# Patient Record
Sex: MALE | Race: BLACK OR AFRICAN AMERICAN | NOT HISPANIC OR LATINO | URBAN - METROPOLITAN AREA
[De-identification: names, ages, dates, MRNs, and addresses within clinical notes are randomized per-mention and may not be internally consistent; named-entity substitution may affect disease eponyms.]

---

## 2017-06-03 ENCOUNTER — EMERGENCY (EMERGENCY)
Facility: HOSPITAL | Age: 57
LOS: 1 days | Discharge: HOME | End: 2017-06-03
Attending: EMERGENCY MEDICINE | Admitting: EMERGENCY MEDICINE
Payer: COMMERCIAL

## 2017-06-03 VITALS
HEART RATE: 56 BPM | TEMPERATURE: 98 F | RESPIRATION RATE: 16 BRPM | DIASTOLIC BLOOD PRESSURE: 97 MMHG | SYSTOLIC BLOOD PRESSURE: 159 MMHG | OXYGEN SATURATION: 100 %

## 2017-06-03 DIAGNOSIS — K42.9 UMBILICAL HERNIA WITHOUT OBSTRUCTION OR GANGRENE: ICD-10-CM

## 2017-06-03 LAB
ALBUMIN SERPL ELPH-MCNC: 3.9 G/DL — SIGNIFICANT CHANGE UP (ref 3.3–5)
ALP SERPL-CCNC: 42 U/L — SIGNIFICANT CHANGE UP (ref 40–120)
ALT FLD-CCNC: 21 U/L — SIGNIFICANT CHANGE UP (ref 4–41)
AMYLASE P1 CFR SERPL: 73 U/L — SIGNIFICANT CHANGE UP (ref 25–125)
APPEARANCE UR: CLEAR — SIGNIFICANT CHANGE UP
APTT BLD: 37 SEC — SIGNIFICANT CHANGE UP (ref 27.5–37.4)
AST SERPL-CCNC: 22 U/L — SIGNIFICANT CHANGE UP (ref 4–40)
BASE EXCESS BLDV CALC-SCNC: 6 MMOL/L — SIGNIFICANT CHANGE UP
BASOPHILS # BLD AUTO: 0.01 K/UL — SIGNIFICANT CHANGE UP (ref 0–0.2)
BASOPHILS NFR BLD AUTO: 0.2 % — SIGNIFICANT CHANGE UP (ref 0–2)
BILIRUB SERPL-MCNC: 0.2 MG/DL — SIGNIFICANT CHANGE UP (ref 0.2–1.2)
BILIRUB UR-MCNC: NEGATIVE — SIGNIFICANT CHANGE UP
BLOOD GAS VENOUS - CREATININE: 1.13 MG/DL — SIGNIFICANT CHANGE UP (ref 0.5–1.3)
BLOOD UR QL VISUAL: NEGATIVE — SIGNIFICANT CHANGE UP
BUN SERPL-MCNC: 18 MG/DL — SIGNIFICANT CHANGE UP (ref 7–23)
CALCIUM SERPL-MCNC: 9.1 MG/DL — SIGNIFICANT CHANGE UP (ref 8.4–10.5)
CHLORIDE BLDV-SCNC: 107 MMOL/L — SIGNIFICANT CHANGE UP (ref 96–108)
CHLORIDE SERPL-SCNC: 103 MMOL/L — SIGNIFICANT CHANGE UP (ref 98–107)
CO2 SERPL-SCNC: 24 MMOL/L — SIGNIFICANT CHANGE UP (ref 22–31)
COLOR SPEC: SIGNIFICANT CHANGE UP
CREAT SERPL-MCNC: 1.16 MG/DL — SIGNIFICANT CHANGE UP (ref 0.5–1.3)
EOSINOPHIL # BLD AUTO: 0.1 K/UL — SIGNIFICANT CHANGE UP (ref 0–0.5)
EOSINOPHIL NFR BLD AUTO: 1.8 % — SIGNIFICANT CHANGE UP (ref 0–6)
GAS PNL BLDV: 135 MMOL/L — LOW (ref 136–146)
GLUCOSE BLDV-MCNC: 97 — SIGNIFICANT CHANGE UP (ref 70–99)
GLUCOSE SERPL-MCNC: 95 MG/DL — SIGNIFICANT CHANGE UP (ref 70–99)
GLUCOSE UR-MCNC: NEGATIVE — SIGNIFICANT CHANGE UP
HCO3 BLDV-SCNC: 27 MMOL/L — SIGNIFICANT CHANGE UP (ref 20–27)
HCT VFR BLD CALC: 46 % — SIGNIFICANT CHANGE UP (ref 39–50)
HCT VFR BLDV CALC: 48.3 % — SIGNIFICANT CHANGE UP (ref 39–51)
HGB BLD-MCNC: 15.2 G/DL — SIGNIFICANT CHANGE UP (ref 13–17)
HGB BLDV-MCNC: 15.8 G/DL — SIGNIFICANT CHANGE UP (ref 13–17)
IMM GRANULOCYTES NFR BLD AUTO: 0 % — SIGNIFICANT CHANGE UP (ref 0–1.5)
INR BLD: 0.96 — SIGNIFICANT CHANGE UP (ref 0.88–1.17)
KETONES UR-MCNC: NEGATIVE — SIGNIFICANT CHANGE UP
LACTATE BLDV-MCNC: 1 MMOL/L — SIGNIFICANT CHANGE UP (ref 0.5–2)
LEUKOCYTE ESTERASE UR-ACNC: NEGATIVE — SIGNIFICANT CHANGE UP
LIDOCAIN IGE QN: 28.2 U/L — SIGNIFICANT CHANGE UP (ref 7–60)
LYMPHOCYTES # BLD AUTO: 1.63 K/UL — SIGNIFICANT CHANGE UP (ref 1–3.3)
LYMPHOCYTES # BLD AUTO: 30.1 % — SIGNIFICANT CHANGE UP (ref 13–44)
MCHC RBC-ENTMCNC: 30.9 PG — SIGNIFICANT CHANGE UP (ref 27–34)
MCHC RBC-ENTMCNC: 33 % — SIGNIFICANT CHANGE UP (ref 32–36)
MCV RBC AUTO: 93.5 FL — SIGNIFICANT CHANGE UP (ref 80–100)
MONOCYTES # BLD AUTO: 0.49 K/UL — SIGNIFICANT CHANGE UP (ref 0–0.9)
MONOCYTES NFR BLD AUTO: 9 % — SIGNIFICANT CHANGE UP (ref 2–14)
MUCOUS THREADS # UR AUTO: SIGNIFICANT CHANGE UP
NEUTROPHILS # BLD AUTO: 3.19 K/UL — SIGNIFICANT CHANGE UP (ref 1.8–7.4)
NEUTROPHILS NFR BLD AUTO: 58.9 % — SIGNIFICANT CHANGE UP (ref 43–77)
NITRITE UR-MCNC: NEGATIVE — SIGNIFICANT CHANGE UP
PCO2 BLDV: 59 MMHG — HIGH (ref 41–51)
PH BLDV: 7.35 PH — SIGNIFICANT CHANGE UP (ref 7.32–7.43)
PH UR: 5.5 — SIGNIFICANT CHANGE UP (ref 4.6–8)
PLATELET # BLD AUTO: 270 K/UL — SIGNIFICANT CHANGE UP (ref 150–400)
PMV BLD: 10.7 FL — SIGNIFICANT CHANGE UP (ref 7–13)
PO2 BLDV: 28 MMHG — LOW (ref 35–40)
POTASSIUM BLDV-SCNC: 4.1 MMOL/L — SIGNIFICANT CHANGE UP (ref 3.4–4.5)
POTASSIUM SERPL-MCNC: 4.4 MMOL/L — SIGNIFICANT CHANGE UP (ref 3.5–5.3)
POTASSIUM SERPL-SCNC: 4.4 MMOL/L — SIGNIFICANT CHANGE UP (ref 3.5–5.3)
PROT SERPL-MCNC: 7.6 G/DL — SIGNIFICANT CHANGE UP (ref 6–8.3)
PROT UR-MCNC: NEGATIVE — SIGNIFICANT CHANGE UP
PROTHROM AB SERPL-ACNC: 10.8 SEC — SIGNIFICANT CHANGE UP (ref 9.8–13.1)
RBC # BLD: 4.92 M/UL — SIGNIFICANT CHANGE UP (ref 4.2–5.8)
RBC # FLD: 13.2 % — SIGNIFICANT CHANGE UP (ref 10.3–14.5)
RBC CASTS # UR COMP ASSIST: SIGNIFICANT CHANGE UP (ref 0–?)
SAO2 % BLDV: 42.1 % — LOW (ref 60–85)
SODIUM SERPL-SCNC: 140 MMOL/L — SIGNIFICANT CHANGE UP (ref 135–145)
SP GR SPEC: 1.02 — SIGNIFICANT CHANGE UP (ref 1–1.03)
SQUAMOUS # UR AUTO: SIGNIFICANT CHANGE UP
UROBILINOGEN FLD QL: NORMAL E.U. — SIGNIFICANT CHANGE UP (ref 0.1–0.2)
WBC # BLD: 5.42 K/UL — SIGNIFICANT CHANGE UP (ref 3.8–10.5)
WBC # FLD AUTO: 5.42 K/UL — SIGNIFICANT CHANGE UP (ref 3.8–10.5)
WBC UR QL: SIGNIFICANT CHANGE UP (ref 0–?)

## 2017-06-03 PROCEDURE — 99285 EMERGENCY DEPT VISIT HI MDM: CPT

## 2017-06-03 RX ORDER — MORPHINE SULFATE 50 MG/1
4 CAPSULE, EXTENDED RELEASE ORAL ONCE
Qty: 0 | Refills: 0 | Status: DISCONTINUED | OUTPATIENT
Start: 2017-06-03 | End: 2017-06-03

## 2017-06-03 RX ADMIN — MORPHINE SULFATE 4 MILLIGRAM(S): 50 CAPSULE, EXTENDED RELEASE ORAL at 08:39

## 2017-06-03 NOTE — ED ADULT NURSE NOTE - OBJECTIVE STATEMENT
pt to rm 4 c/o of navel pain for 3 days no n,v, iv started in rt ac 20 g  and labs sent off/ pain med given with relief

## 2017-06-03 NOTE — CONSULT NOTE ADULT - PROBLEM SELECTOR RECOMMENDATION 9
Please follow up with Dr. Wang within 1-2 weeks after discharge from the hospital. You may call [phone] to schedule an appointment.

## 2017-06-03 NOTE — CONSULT NOTE ADULT - ASSESSMENT
This patient is assessed as being a 56 year-old previously healthy male who has complaints of having a reduced umbilical hernia.

## 2017-06-03 NOTE — ED PROVIDER NOTE - OBJECTIVE STATEMENT
56M h/o HTN presents with abdominal pain.  States 4d ago he noticed a sharp pain in his umbilicus and swelling.  Noted that the pain was somewhat improved with sitting but since that time the has been constant.  No change in size of swelling.  Notes pain when he touches that area and has been unable to push it back in.  No n/v/d.  No fever. No dysuria or hematuria. + prior h/o inguinal hernia repair, no intra-abdominal surgery.

## 2017-06-03 NOTE — ED PROVIDER NOTE - MEDICAL DECISION MAKING DETAILS
56M with abdominal pain and swelling at umbilicus c/w incarcerated vs strangulated umbilical hernia.  No symptoms of bowel obstruction.  Plan for labs, pain control and reduction.

## 2017-06-03 NOTE — CONSULT NOTE ADULT - SUBJECTIVE AND OBJECTIVE BOX
This is a very pleasant 56 year old right handed male who presents with abdominal pain which began on Wednesday prior to ED evaluation admission, approximately 96 hours. He describes the pain as sharp non radiating and constant 6/10 in the umbilical area.  No alleviating factors could be elicited.  He denies any recent trauma. The pain was without fever or chills. He is tolerating a diet, normal bowel movements. He has not had any recent sick exposures and denied having had foreign travel. He has not had urinary symptoms of urgency, frequency or dysuria.     In the ED, the ED faculty reduced his umbilical hernia and his pain resolved. Surgery was consulted.     He has no significant medical history except HTN. He has has a LIHR 15 years ago.. He denied having pulmonary, renal, hepatic, hematologic, CNS, endocrine and / or neoplastic illnesses. Prior to the current evalution he took a antihypertensive. He has no known medication allergies and he is not allergic to latex. He does not use ethanol or tobacco.     His 10-point review of systems is otherwise negative. He is a . His family history is noncontributory.   Vital Signs: T(C): 36.7, Max: 36.7 HR: 56 (56 - 56) BP: 159/97 (159/97 - 159/97) RR: 16 (16 - 16) SpO2: 100% (100% - 100%)  He was awake, alert and in no distress  He was anicteric and he did not have thrush. His oropharyngeal mucosa was normal. He had reactive pupils and his extra-occular movements were intact. He did not have JVD. His lungs were clear bilaterally and He had non-labored respirations. He had symmetrical chest wall movements. He had regular heart tones and he did not have a murmur or gallop. His abdomen was soft, non distended without tenderness  There were no palpable masses or abdominal wall hernias. He had active bowel sounds. He had normal external genitalia. He did not have a rash. His extremities were well perfused. He had a normal musculoskeletal exam.    A 22 gauge IV cannula was inserted into a vein of his right arm and fluid was given.     CBC Full  -  ( 03 Jun 2017 08:21 )  WBC Count : 5.42 K/uL  Hemoglobin : 15.2 g/dL  Hematocrit : 46.0 %  Platelet Count - Automated : 270 K/uL  Mean Cell Volume : 93.5 fL  Mean Cell Hemoglobin : 30.9 pg  Mean Cell Hemoglobin Concentration : 33.0 %  Auto Neutrophil # : 3.19 K/uL  Auto Lymphocyte # : 1.63 K/uL  Auto Monocyte # : 0.49 K/uL  Auto Eosinophil # : 0.10 K/uL  Auto Basophil # : 0.01 K/uL  Auto Neutrophil % : 58.9 %  Auto Lymphocyte % : 30.1 %  Auto Monocyte % : 9.0 %  Auto Eosinophil % : 1.8 %  Auto Basophil % : 0.2 %    140  |  103  |  18  ----------------------------<  95  4.4   |  24  |  1.16    Ca    9.1      TPro  7.6  /  Alb  3.9  /  TBili  0.2  /  DBili  x   /  AST  22  /  ALT  21  /  AlkPhos  42

## 2017-06-03 NOTE — ED PROVIDER NOTE - PROGRESS NOTE DETAILS
Ahmet: Umbilical hernia partially reduced at bedside. Evaluated by surgery, can follow up in clinic w/ Dr. Wang. Labs reassuring with normal lactate, no suspicion for strangulated bowel. Okay for dc.

## 2019-02-14 ENCOUNTER — EMERGENCY (EMERGENCY)
Facility: HOSPITAL | Age: 59
LOS: 1 days | Discharge: HOME | End: 2019-02-14
Attending: EMERGENCY MEDICINE | Admitting: EMERGENCY MEDICINE
Payer: COMMERCIAL

## 2019-02-14 VITALS
SYSTOLIC BLOOD PRESSURE: 168 MMHG | RESPIRATION RATE: 16 BRPM | HEART RATE: 62 BPM | TEMPERATURE: 98 F | OXYGEN SATURATION: 100 % | DIASTOLIC BLOOD PRESSURE: 110 MMHG

## 2019-02-14 VITALS
TEMPERATURE: 98 F | DIASTOLIC BLOOD PRESSURE: 106 MMHG | OXYGEN SATURATION: 100 % | SYSTOLIC BLOOD PRESSURE: 174 MMHG | RESPIRATION RATE: 18 BRPM | HEART RATE: 57 BPM

## 2019-02-14 PROCEDURE — 99283 EMERGENCY DEPT VISIT LOW MDM: CPT

## 2019-02-14 RX ORDER — MECLIZINE HCL 12.5 MG
25 TABLET ORAL ONCE
Qty: 0 | Refills: 0 | Status: COMPLETED | OUTPATIENT
Start: 2019-02-14 | End: 2019-02-14

## 2019-02-14 RX ORDER — MECLIZINE HCL 12.5 MG
1 TABLET ORAL
Qty: 40 | Refills: 0 | OUTPATIENT
Start: 2019-02-14 | End: 2019-02-23

## 2019-02-14 RX ORDER — ACETAMINOPHEN 500 MG
975 TABLET ORAL ONCE
Qty: 0 | Refills: 0 | Status: COMPLETED | OUTPATIENT
Start: 2019-02-14 | End: 2019-02-14

## 2019-02-14 RX ADMIN — Medication 25 MILLIGRAM(S): at 22:35

## 2019-02-14 RX ADMIN — Medication 975 MILLIGRAM(S): at 22:34

## 2019-02-14 NOTE — ED ADULT NURSE NOTE - OBJECTIVE STATEMENT
Pt w/ x of htn c/o episodic positional dizziness lasting few seconds and resolving spontaneously x 2-3 days, Pt also c/o headache & htn unrelieved by current meds.  Pt p/w NAD breaths even unlabored skin warm dry intact noted moderately hypertense.  Vitals as noted Meds as ordered.  Will monitor.

## 2019-02-14 NOTE — ED PROVIDER NOTE - CLINICAL SUMMARY MEDICAL DECISION MAKING FREE TEXT BOX
Arjun Pedroza (Resident): 59 y/o male p/w headache - 36 hours of aching headache - recently getting over a cold - headache w/o any red flags (no sudden onset, not worse of life) w/ neuro intact and mildly elevated HTN - also c/o classic BPPV - positional, lasts well under 30 seconds, and was able to be reproduced here - low concern of any ICH or process causing vertigo - will treat conservatively, EKG, and rx headache

## 2019-02-14 NOTE — ED ADULT NURSE NOTE - NSIMPLEMENTINTERV_GEN_ALL_ED
Implemented All Universal Safety Interventions:  Joanna to call system. Call bell, personal items and telephone within reach. Instruct patient to call for assistance. Room bathroom lighting operational. Non-slip footwear when patient is off stretcher. Physically safe environment: no spills, clutter or unnecessary equipment. Stretcher in lowest position, wheels locked, appropriate side rails in place.

## 2019-02-14 NOTE — ED PROVIDER NOTE - PROGRESS NOTE DETAILS
Arjun Pedroza (Resident): patient up, walking around - will send some anti-vert - d/w patient return precautions and addressing BP with his PMD - safe to d/c home - AJM: pt feeling improved. dc home with pmd follow up. return precautions discussed with pt and daughter.

## 2019-02-14 NOTE — ED ADULT TRIAGE NOTE - CHIEF COMPLAINT QUOTE
Ambulatory complaining of symptomatic hypertension since yesterday. Complains of dizziness especially when changing positions. Went to see his PMD today and was instructed to take one additional dose of his medication and if the BP hasn't changed in a few days to return to see him. Patient was not comfortable and came to ED. Bradycardic. EKG in progress.

## 2019-02-14 NOTE — ED PROVIDER NOTE - ATTENDING CONTRIBUTION TO CARE
AJM: Patient seen with resident and agree with above note. 59 y/o male hx of HTN p/w headache and vertigo. Per patient, when he woke up yesterday began to notice some room-spinning sensation. Statse this occurs when getting up or lying flat, but most often when lying flat. Also states that the symptoms resolve in under 30 seconds. Never had these symptoms before. Also c/o a headache. States been having a headache for past 36 hours, aching, frontal headache. Checked his Bp at home and was high, SBP of 160. Given headache and dizziness, went to PMD who recommended taking an extra 2.5mg of amlodipine and told him he would send vertigo meds, but never did. Patient denies fever, neck pain, N/V/D, chest pain, diff breathing. Headache aching, frontal, non radiating, no vision changes or speech changes, photo or phonophobia. Normal gait, normal neuro exam. No need for imaging or workup. likely peripheral vertigo and asymptomatic htn. treat with Tylenol. and meclizine. return precautions pmd followup

## 2019-02-14 NOTE — ED PROVIDER NOTE - NEUROLOGICAL, MLM
Alert and oriented, no focal deficits, no motor or sensory deficits. Cn2-12 intact. Neg rhomberg. No pronator drift. Dizziness provoked after going from sitting to lying, resolved after 5 seconds

## 2019-02-14 NOTE — ED PROVIDER NOTE - NSFOLLOWUPINSTRUCTIONS_ED_ALL_ED_FT
1) Please return to the ED should you have any new or worsening symptoms, worsening pain, develop chest pain or any concerning symptoms  2) Please follow up with your primary care doctor in 2-3 days.  3) Please  Meclizine (Antivert)  from your pharmacy. Please take 1 tab every 6 hours as needed for dizziness. Please do not take more than 4 tabs in 24 hours.

## 2019-02-14 NOTE — ED PROVIDER NOTE - OBJECTIVE STATEMENT
59 y/o male hx of HTN p/w headache and vertigo. Per patient, when he woke up yesterday began to notice some room-spinning sensation. Statse this occurs when getting up or lying flat, but most often when lying flat. Also states that the symptoms resolve in under 30 seconds. Never had these symptoms before. Also c/o a headache. States been having a headache for past 36 hours, aching, frontal headache. Checked his Bp at home and was high, SBP of 160. Given headache and dizziness, went to PMD who recommended taking an extra 2.5mg of amlodipine and told him he would send vertigo meds, but never did. Patient denies fever, neck pain, N/V/D, chest pain, diff breathing. Headache aching, frontal, non radiating, no vision changes or speech changes, photo or phonophobia.

## 2019-02-14 NOTE — ED PROVIDER NOTE - EYES, MLM
Clear bilaterally, pupils equal, round and reactive to light. EOMI. When going from sitting to lying, nystagmus appreciated horitontally, resolves after 5 seconds.

## 2019-02-15 PROBLEM — I10 ESSENTIAL (PRIMARY) HYPERTENSION: Chronic | Status: ACTIVE | Noted: 2017-06-03

## 2020-10-09 ENCOUNTER — APPOINTMENT (OUTPATIENT)
Dept: ORTHOPEDIC SURGERY | Facility: CLINIC | Age: 60
End: 2020-10-09
Payer: COMMERCIAL

## 2020-10-09 VITALS
DIASTOLIC BLOOD PRESSURE: 94 MMHG | OXYGEN SATURATION: 98 % | HEART RATE: 64 BPM | BODY MASS INDEX: 32.58 KG/M2 | HEIGHT: 68 IN | SYSTOLIC BLOOD PRESSURE: 151 MMHG | WEIGHT: 215 LBS

## 2020-10-09 DIAGNOSIS — Z78.9 OTHER SPECIFIED HEALTH STATUS: ICD-10-CM

## 2020-10-09 DIAGNOSIS — M22.41 CHONDROMALACIA PATELLAE, RIGHT KNEE: ICD-10-CM

## 2020-10-09 DIAGNOSIS — Z86.79 PERSONAL HISTORY OF OTHER DISEASES OF THE CIRCULATORY SYSTEM: ICD-10-CM

## 2020-10-09 DIAGNOSIS — Z80.9 FAMILY HISTORY OF MALIGNANT NEOPLASM, UNSPECIFIED: ICD-10-CM

## 2020-10-09 DIAGNOSIS — Z82.49 FAMILY HISTORY OF ISCHEMIC HEART DISEASE AND OTHER DISEASES OF THE CIRCULATORY SYSTEM: ICD-10-CM

## 2020-10-09 PROCEDURE — 73564 X-RAY EXAM KNEE 4 OR MORE: CPT | Mod: RT

## 2020-10-09 PROCEDURE — 99204 OFFICE O/P NEW MOD 45 MIN: CPT

## 2020-10-09 RX ORDER — AMLODIPINE BESYLATE AND BENAZEPRIL HYDROCHLORIDE 2.5; 1 MG/1; MG/1
2.5-1 CAPSULE ORAL
Refills: 0 | Status: ACTIVE | COMMUNITY

## 2020-10-09 RX ORDER — DICLOFENAC SODIUM 50 MG/1
50 TABLET, DELAYED RELEASE ORAL
Qty: 60 | Refills: 1 | Status: ACTIVE | COMMUNITY
Start: 2020-10-09 | End: 1900-01-01

## 2020-10-09 NOTE — DISCUSSION/SUMMARY
[de-identified] : Patellofemoral syndrome or chondritis or chondromalacia is a spectrum of disease of the cartilage in the joint between the patella, or knee cap, and the femoral trochlea, or thigh bone ranging from overload of the joint, to irritation of the cartilage and finally wear of the cartilage. The patella has a convex v shaped joint surface and the trochlea a matching concave v shape. The patella runs in the trochlea valley as the knee bends, increasing the leverage and allowing knee range of motion. However the patellofemoral joint experiences 20-50x the body weight in force when going up and down stairs during mid-flexion of the knee. The joint is most symptomatic with prolonged knee flexion or going up/down stairs. The treatment for this problem is predominantly non-operative consisting of patient education, activity modification, physical therapy with focus on VMO strengthening, oral and topical non-steroidal anti-inflammatories, knee braces including lateral J/shield's brace or infrapatellar band, and corticosteroid/viscosupplementation injection. The symptoms are chronic and are difficult to treat. In cases of failure to respond to non-operative management surgical options can be considered.\par \par  \par Physical therapy prescribed with knee ROM exercises, quad/hamstring/VMO/Hip abductor strengthening, knee taping, patella mobilization, modalities PRN, and home exercise program.\par \par \par The patient was prescribed Diclofenac PO non-steroidal anti-inflammatory medication. 50mg tablets twice daily to be taken for at least 1-2 weeks in a row and then PRN afterwards. Risks and benefits were discussed and include but not limited to renal damage and GI ulceration and bleeding.  They were advised to take with food to limit stomach upset as well as warned to stop the medication if worsening gastric pain or dizziness or other side effects. Also to immediately stop the medication and seek appriate medical attention if any severe stomach ache, gastritis, black/red vomit, black/red stools or any other medical concern.\par \par \par The patient verifies their understanding the the visit, diagnosis and plan. They agree with the treatment plan and will contact the office with any questions or problems.\par  \par \par Follow up\par \par PRN

## 2020-10-09 NOTE — PHYSICAL EXAM
[de-identified] : Physical Examination\par General: well nourished, in no acute distress, alert and oriented to person, place and time\par Psychiatric: normal mood and affect, no abnormal movements or speech patterns\par Eyes: vision intact  WITHOUT glasses\par Throat: no thyromegaly\par Lymph: no enlarged nodes, no lymphedema in extremity\par Respiratory: no wheezing, no shortness of breath with ambulation\par Cardiac: no cardiac leg swelling, 2+ peripheral pulses\par Neurology: normal gross sensation in extremities to light touch\par Abdomen: soft, non-tender, tympanic, no masses\par  \par Musculoskeletal Examination\par Ambulation           - antalgic gait, - assistive devices\par  \par Knee                                      Right                                      Left\par General\par      Swelling/Deformity        normal                                   normal  \par      Skin                                   normal                                   normal\par      Erythema                         -                                               -\par      Standing Alignment       neutral                                      neutral\par      Effusion                            trace                                       normal\par Range of Motion\par      Hip                                     full painless ROM                                full painless ROM\par      Knee Flexion                   130                                        130\par      Knee Extension              0                                              0\par Patella\par      J Sign                                -                                               -\par      Quad Medial/Lateral      1/1 1/1\par      Apprehension                 -                                               -\par      Juan M's                               +                                              -\par      Grind Sign                        +                                              -\par      Crepitus                            -                                              -\par Palpation\par      Medial Joint Line            -                                              -\par      Medial Fem Condyle     -                                               -\par      Lateral Joint Line            -                                               -\par      Quad Tendon                  -                                               -\par      Patella Tendon               -                                               -\par      Medial Patella                 -                                               -\par      Lateral Patella                 -                                               -\par      Posterior Knee                -                                              -\par Ligamentous\par      Varus @ 0° / 30°             -/-                                            -/-\par      Valgus @ 0° / 30°           -/-                                            -/-\par      Lachman                          -                                               -\par      Pivot Shift                         -                                               -\par      Anterior Drawer              -                                               -\par      Posterior Drawer            -                                               -\par Meniscus\par      Dariana                           -                                               -\par      Flexion Pinch                  -                                               -\par Strength Examination/Atrophy\par      Hip Flexors                      5+                                           5+\par      Quadriceps                      5+                                           5+\par      Hamstring                        5+                                           5+\par      Tibialis Anterior               5+                                           5+\par      Achilles/Soleus               5+                                           5+\par Sensation\par      Deep Peroneal               normal                                   normal\par      Superficial Peroneal      normal                                   normal\par      Sural                                 normal                                   normal\par      Posterior Tibial                normal                                   normal\par      Saphneous                      normal                                   normal\par Pulses\par      DP                                     2+                                           2+\par \par  [de-identified] : 4 views of the affected knee (standing AP, flexing standing AP, 30degree flexed lateral, sunrise view)\par were ordered, obtained and evaluated by myself today and\par demonstrate:\par  \par RIGHT\par There is Mild Medial weightbearing Asymmetric narrowing \par trace osteophytic lipping\par trace suprapatellar effusion\par Mild Lateral patellofemoral joint space loss without evidence of subluxation on sunrise view\par Normal soft tissue density\par Otherwise normal osseous bone structure without fracture or dislocation\par

## 2020-10-09 NOTE — HISTORY OF PRESENT ILLNESS
[de-identified] : CC:   Knee pain\par \par HPI 60 year male  presents with CHRONIC onset of 1mo and CONSTANT ACTIVITY RELATED in the anterior knee WITHOUT INJURY. The pain is WORSE and rated a 2 out of 10, described as achy stiffness, WITHOUT RADIATION. REST makes the pain better and WALKING, sitting, STANDING STAIRS makes the pain worse. The patient reports no associated symptoms. The patient DENIES pain at night affecting sleep, and denies similar pain previously. \par  \par The patient has tried the following treatments:\par Activity modification            -\par Ice/Compression                 -\par Braces                                   -\par Nsaids                                   -\par Physical Therapy                 -\par Cortisone Injection              -\par Visco Injection                      -\par Zilretta                                    -\par Arthroscopy                          -\par  \par Review of Systems is positive for the above musculoskeletal symptoms and is otherwise non-contributory for general, constitutional, psychiatric, neurologic, HEENT, cardiac, respiratory, gastrointestinal, reproductive, lymphatic, and dermatologic complaints.\par  \par Consult by \adrián \par

## 2020-10-22 NOTE — ED PROVIDER NOTE - CARDIAC, MLM
Lazarus Oliva is a 23year old male. Patient presents with: Follow - Up: possibly getting a holter montior      HPI:     Symptoms resolved on 10/16/2020. Has not had the symptoms since then. No changes in diet or lifestyle prior to this improving. club or organization: Not on file        Attends meetings of clubs or organizations: Not on file        Relationship status: Not on file      Intimate partner violence        Fear of current or ex partner: Not on file        Emotionally abused: Not on file Visit:  Orders Placed This Encounter      Flulaval 0.5 ml >= 6 months Quad single dose Pres Free (25599)    No orders of the defined types were placed in this encounter. There are no Patient Instructions on file for this visit.     Return if symptoms w Normal rate, regular rhythm.  Heart sounds S1, S2.  No murmurs, rubs or gallops.

## 2021-08-17 ENCOUNTER — EMERGENCY (EMERGENCY)
Facility: HOSPITAL | Age: 61
LOS: 1 days | Discharge: HOME | End: 2021-08-17
Attending: EMERGENCY MEDICINE | Admitting: EMERGENCY MEDICINE
Payer: COMMERCIAL

## 2021-08-17 VITALS
SYSTOLIC BLOOD PRESSURE: 159 MMHG | HEART RATE: 62 BPM | OXYGEN SATURATION: 100 % | RESPIRATION RATE: 18 BRPM | DIASTOLIC BLOOD PRESSURE: 97 MMHG | TEMPERATURE: 98 F

## 2021-08-17 LAB — SARS-COV-2 RNA SPEC QL NAA+PROBE: SIGNIFICANT CHANGE UP

## 2021-08-17 PROCEDURE — 99282 EMERGENCY DEPT VISIT SF MDM: CPT

## 2021-08-17 NOTE — ED PROVIDER NOTE - OBJECTIVE STATEMENT
61 year old with no symptoms. request coivd swab 61 year old with no symptoms. request covid swab for travel

## 2021-08-17 NOTE — ED PROVIDER NOTE - PATIENT PORTAL LINK FT
You can access the FollowMyHealth Patient Portal offered by [facility] by registering at the following website: [email or web address]. By joining [employer]’s FollowMyHealth portal, you will also be able to view your health information using other applications (apps) compatible with our system.

## 2021-11-28 ENCOUNTER — EMERGENCY (EMERGENCY)
Facility: HOSPITAL | Age: 61
LOS: 1 days | Discharge: HOME | End: 2021-11-28
Attending: EMERGENCY MEDICINE | Admitting: EMERGENCY MEDICINE
Payer: COMMERCIAL

## 2021-11-28 VITALS
TEMPERATURE: 98 F | RESPIRATION RATE: 16 BRPM | SYSTOLIC BLOOD PRESSURE: 151 MMHG | OXYGEN SATURATION: 100 % | HEART RATE: 67 BPM | DIASTOLIC BLOOD PRESSURE: 90 MMHG

## 2021-11-28 LAB — SARS-COV-2 RNA SPEC QL NAA+PROBE: SIGNIFICANT CHANGE UP

## 2021-11-28 PROCEDURE — 99282 EMERGENCY DEPT VISIT SF MDM: CPT

## 2021-11-28 NOTE — ED PROVIDER NOTE - PHYSICAL EXAMINATION
Gen: NAD, non-toxic appearing  Head: normal appearing  HEENT: normal conjunctiva  Lung: no respiratory distress, speaking in full sentences, CTA over bilateral lung fields  CV: regular rate and rhythm, no murmurs  Abd: soft, non distended, non tender   MSK: no visible deformities  Neuro: alert and grossly oriented, no gross motor deficits  Skin: No jesus rashes

## 2021-11-28 NOTE — ED PROVIDER NOTE - CLINICAL SUMMARY MEDICAL DECISION MAKING FREE TEXT BOX
asymptomatic male presenting for pre-travel covid-19 testing. will provide. no concerning data on subjective or objective portion of the evaluation indicating further ED work up. encouraged the patient to seek cheaper and more easily accessible forms of pre-travel testing, as this is his second trip to the ED for this reason. asymptomatic male presenting for pre-travel covid-19 testing. will provide. no concerning data on subjective or objective portion of the evaluation indicating further ED work up. encouraged the patient to seek cheaper and more easily accessible forms of pre-travel testing, as this is his second trip to the ED for this reason.  [place]: asymptomatic here for testing before travel. +vaccinated. Will test and d/c

## 2021-11-28 NOTE — ED PROVIDER NOTE - OBJECTIVE STATEMENT
asymptomatic 60 yo M, hx of HTN on amlodipine, presenting for a covid swab. needs this to travel to [place] on Wednesday to honor his father's death. no active complaints. no recent changes to medications. no known allergies.

## 2021-11-28 NOTE — ED PROVIDER NOTE - NS ED ROS FT
GENERAL: no fever  EYES: no eye pain  HEENT: no neck pain  CARDIAC: no chest pain  PULMONARY: no SOB  GI: no abdominal pain  : no dysuria  SKIN: no rashes  NEURO: no headache  MSK: no new joint pain

## 2021-11-28 NOTE — ED PROVIDER NOTE - NSFOLLOWUPINSTRUCTIONS_ED_ALL_ED_FT
Follow up with your primary care doctor for routine care.     Immediately return to the Emergency Department for any new symptoms.

## 2021-11-28 NOTE — ED PROVIDER NOTE - PATIENT PORTAL LINK FT
You can access the FollowMyHealth Patient Portal offered by [facility] by registering at the following website: [email or web address]. By joining [employer]’s FollowMyHealth portal, you will also be able to view your health information using other applications (apps) compatible with our system.

## 2021-12-20 ENCOUNTER — TRANSCRIPTION ENCOUNTER (OUTPATIENT)
Age: 61
End: 2021-12-20

## 2021-12-29 ENCOUNTER — EMERGENCY (EMERGENCY)
Facility: HOSPITAL | Age: 61
LOS: 1 days | Discharge: HOME | End: 2021-12-29
Admitting: EMERGENCY MEDICINE
Payer: COMMERCIAL

## 2021-12-29 VITALS
DIASTOLIC BLOOD PRESSURE: 94 MMHG | TEMPERATURE: 98 F | RESPIRATION RATE: 18 BRPM | HEART RATE: 72 BPM | SYSTOLIC BLOOD PRESSURE: 164 MMHG | OXYGEN SATURATION: 100 %

## 2021-12-29 LAB — SARS-COV-2 RNA SPEC QL NAA+PROBE: SIGNIFICANT CHANGE UP

## 2021-12-29 PROCEDURE — 99282 EMERGENCY DEPT VISIT SF MDM: CPT

## 2021-12-29 NOTE — ED ADULT TRIAGE NOTE - CHIEF COMPLAINT QUOTE
Patient requesting a covid test, his son tested + and he was with him on Monday. Patient denies any symptoms.

## 2021-12-29 NOTE — ED PROVIDER NOTE - PATIENT PORTAL LINK FT
You can access the FollowMyHealth Patient Portal offered by [facility] by registering at the following website: [email or web address]. By joining [employer]’s FollowMyHealth portal, you will also be able to view your health information using other applications (apps) compatible with our system.

## 2021-12-29 NOTE — ED PROVIDER NOTE - NSFOLLOWUPINSTRUCTIONS_ED_ALL_ED_FT
-- If you test positive: regardless of vaccination status, stay home for 5 days. If you have no symptoms after 5 days, you can leave your house. Continue to wear a mask around others for 5 ADDITIONAL days. If you have a fever, continue to stay home until your fever resolves.    -- This is likely a virus, possibly COVID 19.  There is no direct treatment for a virus, and antibiotics are not effective.     -- Rest, increase your fluid intake, and avoid dehydration.  -- You can check your oxygen levels at home with a Pulse Oximeter device (pulse ox).  -- Check your temperature at home with a thermometer, take Tylenol for fever of 100.4 or greater.  -- It is very important to be diligent about hand washing & hygiene, wearing a mask, and self quarantining to avoid spreading the illness to others.  -- If you are having any new or worsening symptoms, such as shortness of breath, chest pain, please see your doctor or return to the Emergency Department.  -- You should receive your COVID results via email or on the patient portal Follow My Health.

## 2021-12-29 NOTE — ED PROVIDER NOTE - OBJECTIVE STATEMENT
62 y/o male with pmhx of HTN presents to ED for COVID swab. Pt states he was exposed to his grandson 3 days ago, grandson started having symptoms 2 days ago and tested positive for COVID today. Pt denies any symptoms, feeling well. Pt is covid vaccinated.

## 2021-12-29 NOTE — ED PROVIDER NOTE - CLINICAL SUMMARY MEDICAL DECISION MAKING FREE TEXT BOX
60 y/o male with pmhx of HTN presents to ED for COVID swab. Pt states he was exposed to his grandson 3 days ago, grandson started having symptoms 2 days ago and tested positive for COVID today. Pt denies any symptoms, feeling well. Pt is covid vaccinated. plan for covid test and dc

## 2022-06-06 ENCOUNTER — EMERGENCY (EMERGENCY)
Facility: HOSPITAL | Age: 62
LOS: 1 days | Discharge: HOME | End: 2022-06-06
Admitting: STUDENT IN AN ORGANIZED HEALTH CARE EDUCATION/TRAINING PROGRAM
Payer: COMMERCIAL

## 2022-06-06 VITALS
TEMPERATURE: 98 F | OXYGEN SATURATION: 100 % | DIASTOLIC BLOOD PRESSURE: 88 MMHG | RESPIRATION RATE: 16 BRPM | HEART RATE: 69 BPM | SYSTOLIC BLOOD PRESSURE: 149 MMHG | HEIGHT: 68 IN | WEIGHT: 216.93 LBS

## 2022-06-06 PROCEDURE — 99283 EMERGENCY DEPT VISIT LOW MDM: CPT

## 2022-06-06 RX ORDER — CEPHALEXIN 500 MG
1 CAPSULE ORAL
Qty: 20 | Refills: 0
Start: 2022-06-06 | End: 2022-06-10

## 2022-06-06 NOTE — ED ADULT TRIAGE NOTE - CHIEF COMPLAINT QUOTE
pt has sty to right eyelid x 6 days. Has been using warm compresses but not reducing in size. Denies any change in vision.

## 2022-06-06 NOTE — ED PROVIDER NOTE - EYE, RIGHT
tenderness and swelling to R upper eyelid. minor erythema. eoms intact. no facial tenderness or swelling/clear/pupils equal, round, and reactive to light

## 2022-06-06 NOTE — ED ADULT TRIAGE NOTE - INTERNATIONAL TRAVEL DAYS 1
11/09/20                            Dorothy Yañez  [address] [address]    To Whom It May Concern:    This is to certify Dorothy Yañez was evaluated with [facility] on 11/09/20. Please excuse Kailash Yañez from 11/10 to 11/13/2020 to assist in childcare while his wife is ill.                   [facility]  [facility] [address] [address]  Phone: [phone]     7-14 days ([place])

## 2022-06-06 NOTE — ED PROVIDER NOTE - OBJECTIVE STATEMENT
62 y/o M pmh htn c/o stye to R upper eyelid x 6days. Pt has been applying warm compress. States the stye is getting bigger and more painful. Denies visual changes, fever, chills, cp, sob.

## 2022-06-06 NOTE — ED PROVIDER NOTE - PATIENT PORTAL LINK FT
You can access the FollowMyHealth Patient Portal offered by [facility] by registering at the following website: [email or web address]. By joining [employer]’s FollowMyHealth portal, you will also be able to view your health information using other applications (apps) compatible with our system.

## 2022-06-06 NOTE — ED PROVIDER NOTE - NSFOLLOWUPINSTRUCTIONS_ED_ALL_ED_FT
Take antibiotics as directed  Apply Hot compresses for 15min every 6hrs    Stye  A stye, also known as a hordeolum, is a bump that forms on an eyelid. It may look like a pimple next to the eyelash. A stye can form inside the eyelid (internal stye) or outside the eyelid (external stye). A stye can cause redness, swelling, and pain on the eyelid.    Styes are very common. Anyone can get them at any age. They usually occur in just one eye, but you may have more than one in either eye.    What are the causes?  A stye is caused by an infection. The infection is almost always caused by bacteria called Staphylococcus aureus. This is a common type of bacteria that lives on the skin.    An internal stye may result from an infected oil-producing gland inside the eyelid. An external stye may be caused by an infection at the base of the eyelash (hair follicle).    What increases the risk?  You are more likely to develop a stye if:  You have had a stye before.  You have any of these conditions:  Diabetes.  Red, itchy, inflamed eyelids (blepharitis).  A skin condition such as seborrheic dermatitis or rosacea.  High fat levels in your blood (lipids).  What are the signs or symptoms?  The most common symptom of a stye is eyelid pain. Internal styes are more painful than external styes. Other symptoms may include:  Painful swelling of your eyelid.  A scratchy feeling in your eye.  Tearing and redness of your eye.  Pus draining from the stye.  How is this diagnosed?  Your health care provider may be able to diagnose a stye just by examining your eye. The health care provider may also check to make sure:  You do not have a fever or other signs of a more serious infection.  The infection has not spread to other parts of your eye or areas around your eye.  How is this treated?  Most styes will clear up in a few days without treatment or with warm compresses applied to the area. You may need to use antibiotic drops or ointment to treat an infection.    In some cases, if your stye does not heal with routine treatment, your health care provider may drain pus from the stye using a thin blade or needle. This may be done if the stye is large, causing a lot of pain, or affecting your vision.    Follow these instructions at home:  Take over-the-counter and prescription medicines only as told by your health care provider. This includes eye drops or ointments.  If you were prescribed an antibiotic medicine, apply or use it as told by your health care provider. Do not stop using the antibiotic even if your condition improves.  Apply a warm, wet cloth (warm compress) to your eye for 5–10 minutes, 4 times a day.  Clean the affected eyelid as directed by your health care provider.  Do not wear contact lenses or eye makeup until your stye has healed.  Do not try to pop or drain the stye.   Do not rub your eye.  Contact a health care provider if:  You have chills or a fever.  Your stye does not go away after several days.  Your stye affects your vision.  Your eyeball becomes swollen, red, or painful.  Get help right away if:  You have pain when moving your eye around.  Summary  A stye is a bump that forms on an eyelid. It may look like a pimple next to the eyelash.  A stye can form inside the eyelid (internal stye) or outside the eyelid (external stye). A stye can cause redness, swelling, and pain on the eyelid.  Your health care provider may be able to diagnose a stye just by examining your eye.  Apply a warm, wet cloth (warm compress) to your eye for 5–10 minutes, 4 times a day.  This information is not intended to replace advice given to you by your health care provider. Make sure you discuss any questions you have with your health care provider.

## 2024-01-03 ENCOUNTER — NON-APPOINTMENT (OUTPATIENT)
Age: 64
End: 2024-01-03

## 2024-05-29 ENCOUNTER — EMERGENCY (EMERGENCY)
Facility: HOSPITAL | Age: 64
LOS: 1 days | Discharge: HOME | End: 2024-05-29
Admitting: STUDENT IN AN ORGANIZED HEALTH CARE EDUCATION/TRAINING PROGRAM
Payer: COMMERCIAL

## 2024-05-29 VITALS
HEIGHT: 69 IN | HEART RATE: 54 BPM | OXYGEN SATURATION: 99 % | SYSTOLIC BLOOD PRESSURE: 163 MMHG | RESPIRATION RATE: 16 BRPM | DIASTOLIC BLOOD PRESSURE: 88 MMHG | WEIGHT: 195.11 LBS | TEMPERATURE: 98 F

## 2024-05-29 VITALS
OXYGEN SATURATION: 100 % | HEART RATE: 51 BPM | DIASTOLIC BLOOD PRESSURE: 102 MMHG | RESPIRATION RATE: 16 BRPM | TEMPERATURE: 98 F | SYSTOLIC BLOOD PRESSURE: 163 MMHG

## 2024-05-29 PROCEDURE — 99283 EMERGENCY DEPT VISIT LOW MDM: CPT

## 2024-05-29 PROCEDURE — 73660 X-RAY EXAM OF TOE(S): CPT | Mod: 26,RT

## 2024-05-29 NOTE — ED PROVIDER NOTE - CLINICAL SUMMARY MEDICAL DECISION MAKING FREE TEXT BOX
63 year old male complaining of atraumatic right 4th toe pain since yesterday.  States he tried to continue his daily activities to monitor is pain would subside but pain continued and is associated with swelling.  Cannot recall blunt trauma, denies wearing tight shoes.  Denies history of Gout.  Denies weakness, bruising, numbness, or any other acute complaints.  Denies taking any medications for pain.    X-Ray Right toe.  Patient declines medication while results are pending.

## 2024-05-29 NOTE — ED PROVIDER NOTE - OBJECTIVE STATEMENT
63 year old male complaining of atraumatic right 4th toe pain since yesterday.  States he tried to continue his daily activities to monitor is pain would subside but pain continued and is associated with swelling.  Cannot recall blunt trauma, denies wearing tight shoes.  Denies history of Gout.  Denies weakness, bruising, numbness, or any other acute complaints.  Denies taking any medications for pain.

## 2024-05-29 NOTE — ED PROVIDER NOTE - MUSCULOSKELETAL, MLM
Right foot; overlying skin intact.  Localized swelling over the top of the Right 4th toe, tender to deep palpation and with flexion.  No other swelling of the remainder of the toe or foot.  Distal sensation intact.  Full active ROM with some pain with wiggling toe and flexion of affected toe.  Peripheral pulses present.  Capillary refill < 2 seconds.

## 2024-05-29 NOTE — ED PROVIDER NOTE - PROGRESS NOTE DETAILS
TIFFANIE Hathaway: TIFFANIE Hathaway:  Red flags provided to Patient for when to return to the ED.  Patient stable at discharge, no signs of acute distress.  BP slightly elevated, asymptomatic.  Denies chest pain, dizziness, headache, visual disturbances.  Patient reports he takes his HTN medication at 5:30 am each morning and is compliant.   Monitored Patient to repeat BP and readings appear similar.   Advised Patient to monitor BP at home and to follow up with his PCP.  Risks of uncontrolled HTN discussed with Patient including CVA and MI.  Patient is asymptomatic at this time.  Elevated BP could be due to toe pain and possibly being anxious to be discharged.   Patient comfortable with discharge home, verbalizes understanding and is agreeable with plan. TIFFANIE Hathaway: Reviewed and discussed results of tests performed with Patient, including incidental findings.  Provided Patient with written copy of test results.  Patient is not aware of recent injury or trauma as noted in X-Ray report.  Ddx gout however Patient does not have pain to mild palpation, no erythema.  No history of Gout.  After shared decision making of treatment with Motrin/Tylenol and RICE vs Gout treatment with Colchicine, Patient prefers to trial Motrin and RICE and will schedule follow up with Podiatry, states will wear open toe shoes and elevate and continue to monitor if symptoms do not improve.  Podiatry referral placed.  Discussed diagnosis at length.  Patient comfortable with discharge home.

## 2024-05-29 NOTE — ED ADULT NURSE NOTE - OBJECTIVE STATEMENT
Pt received to wellness room 1. Pt A&O x4, ambulatory. Pt c/o right 4th phalange pain x 3 days. Pt states he woke up this morning and it was swollen and he was having trouble walking. Hx of HTN. Pt denies trauma, dizziness, headache, vision changes, chest pain, SOB, N/V/D/C, or urinary symptoms. Respirations even and unlabored. +2 pulses in all extremities. Safety maintained. Pending PA evaluation.

## 2024-05-29 NOTE — ED PROVIDER NOTE - NSFOLLOWUPINSTRUCTIONS_ED_ALL_ED_FT
Awake Follow up with a foot specialist (Podiatrist) within the next 3-4 days, sooner if symptoms worsen.  You may take Tylenol and/or Motrin as needed for pain as discussed.    Recommended doses of Tylenol are 650-1000mg every 6 hours.  Recommended doses of Motrin are 400-600mg every 6-8 hours as needed for pain.    Elevate the affected extremity as discussed.  Rest.  Apply ice with a barrier protection 3-4 times daily for 10-15 minutes.    Schedule an appointment with a Podiatrist.    Follow up with your PCP in 2-3 days.  Return to the Emergency Department for any worsening symptoms or other acute concerns or complaints.

## 2024-05-29 NOTE — ED ADULT NURSE REASSESSMENT NOTE - STATUS
as per handoff pt with c/o rt 4th toe pain. Pt received calm smiling affect. Pt st' I don't recall any injury " Pt denies hx gout. Pt presenly declining pain meds. No obvoius swelling reddness noted.

## 2024-05-29 NOTE — ED ADULT TRIAGE NOTE - CHIEF COMPLAINT QUOTE
patient states " My right foot 4 th toe is swollen and hurts" unsure of injury , c/o pain since yesterday.

## 2024-05-29 NOTE — ED PROVIDER NOTE - PATIENT PORTAL LINK FT
You can access the FollowMyHealth Patient Portal offered by [facility] by registering at the following website: [email or web address]. By joining [employer]’s FollowMyHealth portal, you will also be able to view your health information using other applications (apps) compatible with our system.

## 2024-05-30 NOTE — ED ADULT TRIAGE NOTE - BRAND OF COVID-19 VACCINATION
The patient has been examined and the H&P has been reviewed:    I concur with the findings and no changes have occurred since H&P was written.    Anesthesia/Surgery risks, benefits and alternative options discussed and understood by patient/family.          There are no hospital problems to display for this patient.    
Pfizer dose 1 and 2